# Patient Record
Sex: FEMALE | Race: WHITE | URBAN - METROPOLITAN AREA
[De-identification: names, ages, dates, MRNs, and addresses within clinical notes are randomized per-mention and may not be internally consistent; named-entity substitution may affect disease eponyms.]

---

## 2023-03-22 ENCOUNTER — OFFICE VISIT (OUTPATIENT)
Dept: PRIMARY CARE CLINIC | Facility: CLINIC | Age: 63
End: 2023-03-22
Payer: COMMERCIAL

## 2023-03-22 VITALS
BODY MASS INDEX: 28.74 KG/M2 | DIASTOLIC BLOOD PRESSURE: 82 MMHG | WEIGHT: 178.81 LBS | RESPIRATION RATE: 16 BRPM | SYSTOLIC BLOOD PRESSURE: 160 MMHG | TEMPERATURE: 98 F | HEART RATE: 96 BPM | HEIGHT: 66 IN | OXYGEN SATURATION: 99 %

## 2023-03-22 DIAGNOSIS — H93.11 TINNITUS OF RIGHT EAR: ICD-10-CM

## 2023-03-22 DIAGNOSIS — Z12.31 BREAST CANCER SCREENING BY MAMMOGRAM: ICD-10-CM

## 2023-03-22 DIAGNOSIS — Z00.00 ROUTINE GENERAL MEDICAL EXAMINATION AT A HEALTH CARE FACILITY: Primary | ICD-10-CM

## 2023-03-22 DIAGNOSIS — I10 BENIGN ESSENTIAL HTN: ICD-10-CM

## 2023-03-22 PROCEDURE — 3077F SYST BP >= 140 MM HG: CPT | Mod: CPTII,S$GLB,, | Performed by: INTERNAL MEDICINE

## 2023-03-22 PROCEDURE — 3008F PR BODY MASS INDEX (BMI) DOCUMENTED: ICD-10-PCS | Mod: CPTII,S$GLB,, | Performed by: INTERNAL MEDICINE

## 2023-03-22 PROCEDURE — 1160F PR REVIEW ALL MEDS BY PRESCRIBER/CLIN PHARMACIST DOCUMENTED: ICD-10-PCS | Mod: CPTII,S$GLB,, | Performed by: INTERNAL MEDICINE

## 2023-03-22 PROCEDURE — 1159F PR MEDICATION LIST DOCUMENTED IN MEDICAL RECORD: ICD-10-PCS | Mod: CPTII,S$GLB,, | Performed by: INTERNAL MEDICINE

## 2023-03-22 PROCEDURE — 99386 PREV VISIT NEW AGE 40-64: CPT | Mod: S$GLB,,, | Performed by: INTERNAL MEDICINE

## 2023-03-22 PROCEDURE — 3077F PR MOST RECENT SYSTOLIC BLOOD PRESSURE >= 140 MM HG: ICD-10-PCS | Mod: CPTII,S$GLB,, | Performed by: INTERNAL MEDICINE

## 2023-03-22 PROCEDURE — 3079F PR MOST RECENT DIASTOLIC BLOOD PRESSURE 80-89 MM HG: ICD-10-PCS | Mod: CPTII,S$GLB,, | Performed by: INTERNAL MEDICINE

## 2023-03-22 PROCEDURE — 1160F RVW MEDS BY RX/DR IN RCRD: CPT | Mod: CPTII,S$GLB,, | Performed by: INTERNAL MEDICINE

## 2023-03-22 PROCEDURE — 1159F MED LIST DOCD IN RCRD: CPT | Mod: CPTII,S$GLB,, | Performed by: INTERNAL MEDICINE

## 2023-03-22 PROCEDURE — 3008F BODY MASS INDEX DOCD: CPT | Mod: CPTII,S$GLB,, | Performed by: INTERNAL MEDICINE

## 2023-03-22 PROCEDURE — 3079F DIAST BP 80-89 MM HG: CPT | Mod: CPTII,S$GLB,, | Performed by: INTERNAL MEDICINE

## 2023-03-22 PROCEDURE — 99386 PR PREVENTIVE VISIT,NEW,40-64: ICD-10-PCS | Mod: S$GLB,,, | Performed by: INTERNAL MEDICINE

## 2023-03-22 RX ORDER — HYDROCHLOROTHIAZIDE 12.5 MG/1
12.5 TABLET ORAL DAILY
Qty: 30 TABLET | Refills: 11 | Status: SHIPPED | OUTPATIENT
Start: 2023-03-22 | End: 2024-03-04

## 2023-03-22 NOTE — PROGRESS NOTES
Subjective:      Patient ID: Safia Willingham is a 62 y.o. female.    Chief Complaint: Establish Care (Pt stated for the past few months shes been feeling really sleepy during the day), Tinnitus (Pt c/o pounding tinnitis in right ear within the past few weeks/month), and Fall (Pt stated she fell a few weeks ago and hit head on concrete.. CT was done at [facility])      Patient with no known medical problem Presented today to establish care and for wellness exam.    Patient blood pressure seem to be running high.  Patient reports she has checked blood pressure multiple times at home as well and it was running high.  Patient before yesterday check blood pressure a few months ago and at that time blood pressure was okay 120s over 70s.  Patient denies any chest pain shortness of breath but has ankle swelling on and off on long travel.  Patient denies PND and orthopnea.    Patient reports feeling tired and sleepy during the daytime x few months.  Patient denies snoring at night, had 1 observed apnea episode long ago, patient reports not feeling fresh when she wakes up in the morning, patient may fall asleep watching TV, patient denies falling asleep while talking to people or while driving.    Patient also complained of tinnitus/ringing in right ear for last few months.  Patient reports the symptoms are going on for long but it is becoming more louder/pounding.  It is intermittent + also has some decreased hearing.  Patient denies any runny nose, nasal congestion, postnasal drip.    Patient reports an episode where she was running backwards and had a fall landing and her buttocks and hit the head on concrete.  Patient denies any loss of consciousness, no weakness in arms or legs.  Patient was taken to hospital where CT scan of the head was normal.  Patient reports headaches are better but still has some sacral area pain.         Review of Systems   Constitutional:  Negative for chills, diaphoresis, fever, malaise/fatigue and  weight loss.   HENT:  Positive for tinnitus. Negative for congestion, ear pain, sinus pain and sore throat.    Eyes:  Negative for blurred vision and photophobia.   Respiratory:  Negative for cough, hemoptysis, shortness of breath and wheezing.    Cardiovascular:  Negative for chest pain, palpitations, orthopnea, leg swelling and PND.   Gastrointestinal:  Negative for abdominal pain, blood in stool, constipation, diarrhea, heartburn, melena, nausea and vomiting.   Genitourinary:  Negative for dysuria, frequency and urgency.   Musculoskeletal:  Negative for back pain, myalgias and neck pain.   Skin:  Negative for rash.   Neurological:  Negative for dizziness, tremors, seizures, loss of consciousness and weakness.   Endo/Heme/Allergies:  Negative for polydipsia.   Psychiatric/Behavioral:  Negative for depression and hallucinations. The patient does not have insomnia.    Objective:     Physical Exam  Constitutional:       General: She is not in acute distress.     Appearance: She is not diaphoretic.   HENT:      Ears:      Comments: Bilateral tympanic membrane are bulging without any erythema  Neck:      Thyroid: No thyromegaly.      Comments: No thyromegaly  Cardiovascular:      Rate and Rhythm: Normal rate and regular rhythm.      Heart sounds: Normal heart sounds. No murmur heard.  Pulmonary:      Effort: Pulmonary effort is normal. No respiratory distress.      Breath sounds: Normal breath sounds. No wheezing.   Chest:      Chest wall: No tenderness.   Abdominal:      General: Bowel sounds are normal. There is no distension.      Palpations: Abdomen is soft.      Tenderness: There is no abdominal tenderness.   Musculoskeletal:         General: No tenderness.   Neurological:      Mental Status: She is alert and oriented to person, place, and time.      Cranial Nerves: No cranial nerve deficit.      Sensory: No sensory deficit.   Psychiatric:         Behavior: Behavior normal.         Thought Content: Thought content  normal.         Judgment: Judgment normal.     Assessment:       ICD-10-CM ICD-9-CM   1. Routine general medical examination at a health care facility  Z00.00 V70.0   2. Breast cancer screening by mammogram  Z12.31 V76.12   3. Benign essential HTN  I10 401.1   4. Tinnitus of right ear  H93.11 388.30       Plan:      Patient is here for wellness exam.  Will order Annual labs.   Will order mammogram.  Patient reports colonoscopy was done few years ago and does not want to do it again.  Will try to get reports from hospital.  Patient previously blood pressures were under good control but in last few days blood pressures have been persistently high  Will start on hydrochlorothiazide low-dose  Advised patient to monitor blood pressures at home and bring log  Patient has symptoms suggestive of hypothyroidism vs obstructive sleep apnea  Will order basic lab work..  If all is normal will consider sleep study  Patient has some weight gain as well..  Will consider weight loss medication if all labs are normal.   Advised patient to minimize carbohydrate consult  Patient has ringing in right ear will refer to ENT  Patient has serous otitis media advised patient to take Flonase regularly

## 2023-03-23 ENCOUNTER — PATIENT MESSAGE (OUTPATIENT)
Dept: ADMINISTRATIVE | Facility: HOSPITAL | Age: 63
End: 2023-03-23
Payer: COMMERCIAL

## 2023-03-23 DIAGNOSIS — Z12.11 SCREENING FOR COLON CANCER: ICD-10-CM

## 2023-03-23 LAB
ALBUMIN SERPL-MCNC: 4.7 G/DL (ref 3.6–5.1)
ALBUMIN/GLOB SERPL: 2 (CALC) (ref 1–2.5)
ALP SERPL-CCNC: 97 U/L (ref 37–153)
ALT SERPL-CCNC: 12 U/L (ref 6–29)
AST SERPL-CCNC: 14 U/L (ref 10–35)
BASOPHILS # BLD AUTO: 31 CELLS/UL (ref 0–200)
BASOPHILS NFR BLD AUTO: 0.4 %
BILIRUB SERPL-MCNC: 0.5 MG/DL (ref 0.2–1.2)
BUN SERPL-MCNC: 16 MG/DL (ref 7–25)
BUN/CREAT SERPL: ABNORMAL (CALC) (ref 6–22)
CALCIUM SERPL-MCNC: 9.7 MG/DL (ref 8.6–10.4)
CHLORIDE SERPL-SCNC: 106 MMOL/L (ref 98–110)
CHOLEST SERPL-MCNC: 326 MG/DL
CHOLEST/HDLC SERPL: 5.6 (CALC)
CO2 SERPL-SCNC: 23 MMOL/L (ref 20–32)
CREAT SERPL-MCNC: 0.88 MG/DL (ref 0.5–1.05)
EGFR: 73 ML/MIN/1.73M2
EOSINOPHIL # BLD AUTO: 46 CELLS/UL (ref 15–500)
EOSINOPHIL NFR BLD AUTO: 0.6 %
ERYTHROCYTE [DISTWIDTH] IN BLOOD BY AUTOMATED COUNT: 13 % (ref 11–15)
GLOBULIN SER CALC-MCNC: 2.4 G/DL (CALC) (ref 1.9–3.7)
GLUCOSE SERPL-MCNC: 114 MG/DL (ref 65–99)
HBA1C MFR BLD: 5.6 % OF TOTAL HGB
HCT VFR BLD AUTO: 42.7 % (ref 35–45)
HCV AB S/CO SERPL IA: <0.02
HCV AB SERPL QL IA: NORMAL
HDLC SERPL-MCNC: 58 MG/DL
HGB BLD-MCNC: 14.3 G/DL (ref 11.7–15.5)
HIV 1+2 AB+HIV1 P24 AG SERPL QL IA: NORMAL
LDLC SERPL CALC-MCNC: 229 MG/DL (CALC)
LYMPHOCYTES # BLD AUTO: 1070 CELLS/UL (ref 850–3900)
LYMPHOCYTES NFR BLD AUTO: 13.9 %
MCH RBC QN AUTO: 30.8 PG (ref 27–33)
MCHC RBC AUTO-ENTMCNC: 33.5 G/DL (ref 32–36)
MCV RBC AUTO: 91.8 FL (ref 80–100)
MONOCYTES # BLD AUTO: 293 CELLS/UL (ref 200–950)
MONOCYTES NFR BLD AUTO: 3.8 %
NEUTROPHILS # BLD AUTO: 6260 CELLS/UL (ref 1500–7800)
NEUTROPHILS NFR BLD AUTO: 81.3 %
NONHDLC SERPL-MCNC: 268 MG/DL (CALC)
PLATELET # BLD AUTO: 281 THOUSAND/UL (ref 140–400)
PMV BLD REES-ECKER: 10.2 FL (ref 7.5–12.5)
POTASSIUM SERPL-SCNC: 4.7 MMOL/L (ref 3.5–5.3)
PROT SERPL-MCNC: 7.1 G/DL (ref 6.1–8.1)
RBC # BLD AUTO: 4.65 MILLION/UL (ref 3.8–5.1)
SODIUM SERPL-SCNC: 141 MMOL/L (ref 135–146)
TRIGL SERPL-MCNC: 201 MG/DL
TSH SERPL-ACNC: 1.27 MIU/L (ref 0.4–4.5)
WBC # BLD AUTO: 7.7 THOUSAND/UL (ref 3.8–10.8)

## 2023-03-24 DIAGNOSIS — E78.2 MIXED HYPERLIPIDEMIA: Primary | ICD-10-CM

## 2023-03-24 RX ORDER — ATORVASTATIN CALCIUM 40 MG/1
40 TABLET, FILM COATED ORAL DAILY
Qty: 90 TABLET | Refills: 3 | Status: SHIPPED | OUTPATIENT
Start: 2023-03-24 | End: 2024-02-22 | Stop reason: SDUPTHER

## 2023-03-31 ENCOUNTER — PATIENT MESSAGE (OUTPATIENT)
Dept: FAMILY MEDICINE | Facility: CLINIC | Age: 63
End: 2023-03-31
Payer: COMMERCIAL

## 2023-04-05 LAB — HEMOCCULT STL QL IA: NEGATIVE

## 2023-04-14 ENCOUNTER — OFFICE VISIT (OUTPATIENT)
Dept: PRIMARY CARE CLINIC | Facility: CLINIC | Age: 63
End: 2023-04-14
Payer: COMMERCIAL

## 2023-04-14 VITALS
TEMPERATURE: 98 F | SYSTOLIC BLOOD PRESSURE: 145 MMHG | WEIGHT: 173.81 LBS | OXYGEN SATURATION: 97 % | HEIGHT: 66 IN | HEART RATE: 92 BPM | DIASTOLIC BLOOD PRESSURE: 82 MMHG | BODY MASS INDEX: 27.93 KG/M2

## 2023-04-14 DIAGNOSIS — I10 PRIMARY HYPERTENSION: ICD-10-CM

## 2023-04-14 DIAGNOSIS — E66.3 OVERWEIGHT (BMI 25.0-29.9): Primary | ICD-10-CM

## 2023-04-14 DIAGNOSIS — E78.00 HYPERCHOLESTEROLEMIA: ICD-10-CM

## 2023-04-14 PROCEDURE — 99214 OFFICE O/P EST MOD 30 MIN: CPT | Mod: S$GLB,,, | Performed by: INTERNAL MEDICINE

## 2023-04-14 PROCEDURE — 3077F PR MOST RECENT SYSTOLIC BLOOD PRESSURE >= 140 MM HG: ICD-10-PCS | Mod: CPTII,S$GLB,, | Performed by: INTERNAL MEDICINE

## 2023-04-14 PROCEDURE — 3079F DIAST BP 80-89 MM HG: CPT | Mod: CPTII,S$GLB,, | Performed by: INTERNAL MEDICINE

## 2023-04-14 PROCEDURE — 3079F PR MOST RECENT DIASTOLIC BLOOD PRESSURE 80-89 MM HG: ICD-10-PCS | Mod: CPTII,S$GLB,, | Performed by: INTERNAL MEDICINE

## 2023-04-14 PROCEDURE — 1159F PR MEDICATION LIST DOCUMENTED IN MEDICAL RECORD: ICD-10-PCS | Mod: CPTII,S$GLB,, | Performed by: INTERNAL MEDICINE

## 2023-04-14 PROCEDURE — 99214 PR OFFICE/OUTPT VISIT, EST, LEVL IV, 30-39 MIN: ICD-10-PCS | Mod: S$GLB,,, | Performed by: INTERNAL MEDICINE

## 2023-04-14 PROCEDURE — 3077F SYST BP >= 140 MM HG: CPT | Mod: CPTII,S$GLB,, | Performed by: INTERNAL MEDICINE

## 2023-04-14 PROCEDURE — 1160F PR REVIEW ALL MEDS BY PRESCRIBER/CLIN PHARMACIST DOCUMENTED: ICD-10-PCS | Mod: CPTII,S$GLB,, | Performed by: INTERNAL MEDICINE

## 2023-04-14 PROCEDURE — 1160F RVW MEDS BY RX/DR IN RCRD: CPT | Mod: CPTII,S$GLB,, | Performed by: INTERNAL MEDICINE

## 2023-04-14 PROCEDURE — 3008F PR BODY MASS INDEX (BMI) DOCUMENTED: ICD-10-PCS | Mod: CPTII,S$GLB,, | Performed by: INTERNAL MEDICINE

## 2023-04-14 PROCEDURE — 1159F MED LIST DOCD IN RCRD: CPT | Mod: CPTII,S$GLB,, | Performed by: INTERNAL MEDICINE

## 2023-04-14 PROCEDURE — 3044F HG A1C LEVEL LT 7.0%: CPT | Mod: CPTII,S$GLB,, | Performed by: INTERNAL MEDICINE

## 2023-04-14 PROCEDURE — 3044F PR MOST RECENT HEMOGLOBIN A1C LEVEL <7.0%: ICD-10-PCS | Mod: CPTII,S$GLB,, | Performed by: INTERNAL MEDICINE

## 2023-04-14 PROCEDURE — 3008F BODY MASS INDEX DOCD: CPT | Mod: CPTII,S$GLB,, | Performed by: INTERNAL MEDICINE

## 2023-04-14 NOTE — PROGRESS NOTES
Subjective:      Patient ID: Safia Willingham is a 62 y.o. female.    Chief Complaint: Follow-up    With hypertension and was started on hydrochlorothiazide on last visit.  Patient reports home blood pressures are under better controlled running between 120s to 130 over 80s.  Patient denies any chest pain shortness of breath, ankle swelling.  Patient reports compliance with medication    Patient is found to have hypercholesterolemia with total cholesterol of 326 and LDL of 229.  Patient reports father and mother both had cardiovascular disease.  Father had cardiac event and 50s and mother had cardiac issue in 60s.  Denies family history or personal history of xanthelasma.     Patient previously reported feeling tired and fatigued reports feeling better than before.  Also ringing in the ear is improved after starting using Flonase.  Patient previously complained of back pain that seems to have improved as well.     Review of Systems   Constitutional:  Negative for chills, diaphoresis, fever, malaise/fatigue and weight loss.   HENT:  Negative for congestion, ear pain, sinus pain, sore throat and tinnitus.    Eyes:  Negative for blurred vision and photophobia.   Respiratory:  Negative for cough, hemoptysis, shortness of breath and wheezing.    Cardiovascular:  Negative for chest pain, palpitations, orthopnea, leg swelling and PND.   Gastrointestinal:  Negative for abdominal pain, blood in stool, constipation, diarrhea, heartburn, melena, nausea and vomiting.   Genitourinary:  Negative for dysuria, frequency and urgency.   Musculoskeletal:  Negative for back pain, myalgias and neck pain.   Skin:  Negative for rash.   Neurological:  Negative for dizziness, tremors, seizures, loss of consciousness and weakness.   Endo/Heme/Allergies:  Negative for polydipsia.   Psychiatric/Behavioral:  Negative for depression and hallucinations. The patient does not have insomnia.      Objective:     BP (!) 145/82 (BP Location: Right arm,  "Patient Position: Sitting, BP Method: Medium (Automatic))   Pulse 92   Temp 97.6 °F (36.4 °C)   Ht 5' 6" (1.676 m)   Wt 78.8 kg (173 lb 12.8 oz)   SpO2 97%   BMI 28.05 kg/m²     Physical Exam  Constitutional:       General: She is not in acute distress.     Appearance: She is not diaphoretic.   Neck:      Thyroid: No thyromegaly.   Cardiovascular:      Rate and Rhythm: Normal rate and regular rhythm.      Heart sounds: Normal heart sounds. No murmur heard.  Pulmonary:      Effort: Pulmonary effort is normal. No respiratory distress.      Breath sounds: Normal breath sounds. No wheezing.   Abdominal:      General: Bowel sounds are normal. There is no distension.      Palpations: Abdomen is soft.      Tenderness: There is no abdominal tenderness.   Lymphadenopathy:      Cervical: No cervical adenopathy.   Neurological:      Mental Status: She is alert and oriented to person, place, and time.   Psychiatric:         Behavior: Behavior normal.         Thought Content: Thought content normal.         Judgment: Judgment normal.     Assessment:       ICD-10-CM ICD-9-CM   1. Overweight (BMI 25.0-29.9)  E66.3 278.02   2. Hypercholesterolemia  E78.00 272.0   3. Primary hypertension  I10 401.9       Plan:     Patient last LDL of 229 is really high.   Patient  father had cardiovascular event in his 50s.   Patient has " probable"  familial hypercholesterolemia according to Jose broom criteria   Will be aggressive with  cholesterol control.   Unfortunately genetic testing and counseling is not available in [facility].   Patient has hypertension and blood pressures at home are running much better controlled after adding hydrochlorothiazide   Will not change medication advised patient to monitor blood pressure at home and bring log   Patient BMI of 28 suggest patient is overweight.   But patient does not have  big belly or lot of fat tissue.    Advised patient to be more adherent with diet avoid carbohydrate, fried food.  Will not " be very aggressive with weight loss      Medication List with Changes/Refills   Current Medications    ATORVASTATIN (LIPITOR) 40 MG TABLET    Take 1 tablet (40 mg total) by mouth once daily.    HYDROCHLOROTHIAZIDE (HYDRODIURIL) 12.5 MG TAB    Take 1 tablet (12.5 mg total) by mouth once daily.

## 2023-05-08 ENCOUNTER — PATIENT OUTREACH (OUTPATIENT)
Dept: ADMINISTRATIVE | Facility: HOSPITAL | Age: 63
End: 2023-05-08
Payer: COMMERCIAL

## 2023-05-08 NOTE — PROGRESS NOTES
[facility] Mammogram Report: Attempted to contact the patient to discuss overdue mammogram, patient answered the phone and then hung up.

## 2023-05-17 DIAGNOSIS — E78.00 HYPERCHOLESTEROLEMIA: Primary | ICD-10-CM

## 2023-05-18 ENCOUNTER — CLINICAL SUPPORT (OUTPATIENT)
Dept: PRIMARY CARE CLINIC | Facility: CLINIC | Age: 63
End: 2023-05-18
Payer: COMMERCIAL

## 2023-05-18 LAB
ALBUMIN SERPL BCP-MCNC: 3.9 G/DL (ref 3.4–5)
ALP SERPL-CCNC: 99 U/L (ref 45–117)
ALT SERPL W P-5'-P-CCNC: 30 U/L (ref 13–56)
ANION GAP SERPL CALC-SCNC: 6 MMOL/L (ref 3–11)
AST SERPL-CCNC: 23 U/L (ref 15–37)
BILIRUB SERPL-MCNC: 0.6 MG/DL (ref 0.2–1)
BUN SERPL-MCNC: 11 MG/DL (ref 7–18)
BUN/CREAT SERPL: 11.95 RATIO
CALCIUM SERPL-MCNC: 9.4 MG/DL (ref 8.5–10.1)
CHLORIDE SERPL-SCNC: 109 MMOL/L (ref 98–107)
CHOLEST SERPL-MSCNC: 206 MG/DL
CO2 SERPL-SCNC: 27 MMOL/L (ref 21–32)
CREAT SERPL-MCNC: 0.92 MG/DL (ref 0.55–1.02)
GFR ESTIMATION: > 60
GLUCOSE SERPL-MCNC: 106 MG/DL (ref 74–106)
HDLC SERPL-MCNC: 60 MG/DL
LDLC SERPL CALC-MCNC: 112 MG/DL
POTASSIUM SERPL-SCNC: 4.3 MMOL/L (ref 3.5–5.1)
PROT SERPL-MCNC: 7.2 G/DL (ref 6.4–8.2)
SODIUM BLD-SCNC: 142 MMOL/L (ref 131–143)
TRIGL SERPL-MCNC: 170 MG/DL (ref 0–149)
VLDL CHOLESTEROL: 34 MG/DL

## 2023-05-22 ENCOUNTER — OFFICE VISIT (OUTPATIENT)
Dept: PRIMARY CARE CLINIC | Facility: CLINIC | Age: 63
End: 2023-05-22
Payer: COMMERCIAL

## 2023-05-22 VITALS
WEIGHT: 173.63 LBS | HEART RATE: 88 BPM | DIASTOLIC BLOOD PRESSURE: 75 MMHG | SYSTOLIC BLOOD PRESSURE: 127 MMHG | RESPIRATION RATE: 16 BRPM | OXYGEN SATURATION: 97 % | HEIGHT: 66 IN | BODY MASS INDEX: 27.91 KG/M2 | TEMPERATURE: 97 F

## 2023-05-22 DIAGNOSIS — E78.00 HYPERCHOLESTEROLEMIA: ICD-10-CM

## 2023-05-22 DIAGNOSIS — I10 PRIMARY HYPERTENSION: ICD-10-CM

## 2023-05-22 DIAGNOSIS — E66.3 OVERWEIGHT (BMI 25.0-29.9): Primary | ICD-10-CM

## 2023-05-22 PROCEDURE — 1160F RVW MEDS BY RX/DR IN RCRD: CPT | Mod: CPTII,S$GLB,, | Performed by: INTERNAL MEDICINE

## 2023-05-22 PROCEDURE — 1159F MED LIST DOCD IN RCRD: CPT | Mod: CPTII,S$GLB,, | Performed by: INTERNAL MEDICINE

## 2023-05-22 PROCEDURE — 3044F PR MOST RECENT HEMOGLOBIN A1C LEVEL <7.0%: ICD-10-PCS | Mod: CPTII,S$GLB,, | Performed by: INTERNAL MEDICINE

## 2023-05-22 PROCEDURE — 1159F PR MEDICATION LIST DOCUMENTED IN MEDICAL RECORD: ICD-10-PCS | Mod: CPTII,S$GLB,, | Performed by: INTERNAL MEDICINE

## 2023-05-22 PROCEDURE — 99214 PR OFFICE/OUTPT VISIT, EST, LEVL IV, 30-39 MIN: ICD-10-PCS | Mod: S$GLB,,, | Performed by: INTERNAL MEDICINE

## 2023-05-22 PROCEDURE — 3008F PR BODY MASS INDEX (BMI) DOCUMENTED: ICD-10-PCS | Mod: CPTII,S$GLB,, | Performed by: INTERNAL MEDICINE

## 2023-05-22 PROCEDURE — 1160F PR REVIEW ALL MEDS BY PRESCRIBER/CLIN PHARMACIST DOCUMENTED: ICD-10-PCS | Mod: CPTII,S$GLB,, | Performed by: INTERNAL MEDICINE

## 2023-05-22 PROCEDURE — 3074F PR MOST RECENT SYSTOLIC BLOOD PRESSURE < 130 MM HG: ICD-10-PCS | Mod: CPTII,S$GLB,, | Performed by: INTERNAL MEDICINE

## 2023-05-22 PROCEDURE — 3074F SYST BP LT 130 MM HG: CPT | Mod: CPTII,S$GLB,, | Performed by: INTERNAL MEDICINE

## 2023-05-22 PROCEDURE — 3078F PR MOST RECENT DIASTOLIC BLOOD PRESSURE < 80 MM HG: ICD-10-PCS | Mod: CPTII,S$GLB,, | Performed by: INTERNAL MEDICINE

## 2023-05-22 PROCEDURE — 3008F BODY MASS INDEX DOCD: CPT | Mod: CPTII,S$GLB,, | Performed by: INTERNAL MEDICINE

## 2023-05-22 PROCEDURE — 99214 OFFICE O/P EST MOD 30 MIN: CPT | Mod: S$GLB,,, | Performed by: INTERNAL MEDICINE

## 2023-05-22 PROCEDURE — 3044F HG A1C LEVEL LT 7.0%: CPT | Mod: CPTII,S$GLB,, | Performed by: INTERNAL MEDICINE

## 2023-05-22 PROCEDURE — 3078F DIAST BP <80 MM HG: CPT | Mod: CPTII,S$GLB,, | Performed by: INTERNAL MEDICINE

## 2023-05-22 RX ORDER — PHENTERMINE AND TOPIRAMATE 3.75; 23 MG/1; MG/1
1 CAPSULE, EXTENDED RELEASE ORAL DAILY
Qty: 14 CAPSULE | Refills: 0 | Status: SHIPPED | OUTPATIENT
Start: 2023-05-22 | End: 2023-06-05

## 2023-05-22 RX ORDER — PHENTERMINE AND TOPIRAMATE 7.5; 46 MG/1; MG/1
1 CAPSULE, EXTENDED RELEASE ORAL DAILY
Qty: 30 CAPSULE | Refills: 0 | Status: SHIPPED | OUTPATIENT
Start: 2023-05-22 | End: 2023-06-21

## 2023-05-22 NOTE — PROGRESS NOTES
"  Subjective:      Patient ID: Safia Willingham is a 62 y.o. female.    Chief Complaint: Hypertension (F/u)    With hypertension and was started on hydrochlorothiazide on last visit.  Patient reports home blood pressures are under better controlled running between 120s to 130 over 80s.  Patient denies any chest pain shortness of breath, ankle swelling.  Patient reports compliance with medication.    Patient with hypercholesterolemia with LDL of 229.   Patient has " probable"  familial hypercholesterolemia according to Jose broom criteria.  Patient has strong family history of premature coronary artery disease. Patient was started on atorvastatin 40 mg and last LDL of 112 is markedly improved from 02/29.  Patient reports compliance with medication.      Review of Systems   Constitutional:  Negative for chills, diaphoresis, fever, malaise/fatigue and weight loss.   HENT:  Negative for congestion, ear pain, sinus pain, sore throat and tinnitus.    Eyes:  Negative for blurred vision and photophobia.   Respiratory:  Negative for cough, hemoptysis, shortness of breath and wheezing.    Cardiovascular:  Negative for chest pain, palpitations, orthopnea, leg swelling and PND.   Gastrointestinal:  Negative for abdominal pain, blood in stool, constipation, diarrhea, heartburn, melena, nausea and vomiting.   Genitourinary:  Negative for dysuria, frequency and urgency.   Musculoskeletal:  Negative for back pain, myalgias and neck pain.   Skin:  Negative for rash.   Neurological:  Negative for dizziness, tremors, seizures, loss of consciousness and weakness.   Endo/Heme/Allergies:  Negative for polydipsia.   Psychiatric/Behavioral:  Negative for depression and hallucinations. The patient does not have insomnia.    Objective:   /75 Comment: Home BP today  Pulse 88   Temp 97.1 °F (36.2 °C) (Temporal)   Resp 16   Ht 5' 6" (1.676 m)   Wt 78.7 kg (173 lb 9.6 oz)   SpO2 97%   BMI 28.02 kg/m²     Physical Exam  Constitutional: " "      General: She is not in acute distress.     Appearance: She is not diaphoretic.   Neck:      Thyroid: No thyromegaly.   Cardiovascular:      Rate and Rhythm: Normal rate and regular rhythm.      Heart sounds: Normal heart sounds. No murmur heard.  Pulmonary:      Effort: Pulmonary effort is normal. No respiratory distress.      Breath sounds: Normal breath sounds. No wheezing.   Abdominal:      General: Bowel sounds are normal. There is no distension.      Palpations: Abdomen is soft.      Tenderness: There is no abdominal tenderness.   Neurological:      Mental Status: She is alert and oriented to person, place, and time.   Psychiatric:         Behavior: Behavior normal.         Thought Content: Thought content normal.         Judgment: Judgment normal.     Assessment:       ICD-10-CM ICD-9-CM   1. Overweight (BMI 25.0-29.9)  E66.3 278.02   2. Primary hypertension  I10 401.9   3. Hypercholesterolemia  E78.00 272.0       Plan:     Patient has " probable"  familial hypercholesterolemia according to Jose broom criteria.   LDL improved to 112 from 229..   Will try to be aggressive with cholesterol control   Keeping in mind patient is young +  has family history of coronary artery disease   Will increase atorvastatin to 80 mg and see if patient tolerates the medication   Patient blood pressures are under control with hydrochlorothiazide   Office blood pressures are high but home blood pressures are much better controlled.    Advised patient to continue to monitor blood pressures at home.   Patient  is overweight trying to lose weight.  Multiple weight loss medications are discussed   Will use Qsymia    Medication List with Changes/Refills   New Medications    PHENTERMINE-TOPIRAMATE (QSYMIA) 3.75-23 MG CM24    Take 1 each by mouth once daily. for 14 days    PHENTERMINE-TOPIRAMATE (QSYMIA) 7.5-46 MG CM24    Take 1 each by mouth once daily.   Current Medications    ATORVASTATIN (LIPITOR) 40 MG TABLET    Take 1 tablet " (40 mg total) by mouth once daily.    HYDROCHLOROTHIAZIDE (HYDRODIURIL) 12.5 MG TAB    Take 1 tablet (12.5 mg total) by mouth once daily.

## 2023-06-09 LAB — BCS RECOMMENDATION EXT: NORMAL

## 2023-06-23 ENCOUNTER — PATIENT OUTREACH (OUTPATIENT)
Dept: ADMINISTRATIVE | Facility: HOSPITAL | Age: 63
End: 2023-06-23
Payer: COMMERCIAL

## 2023-07-17 ENCOUNTER — OFFICE VISIT (OUTPATIENT)
Dept: PRIMARY CARE CLINIC | Facility: CLINIC | Age: 63
End: 2023-07-17
Payer: MEDICARE

## 2023-07-17 VITALS
WEIGHT: 159.81 LBS | DIASTOLIC BLOOD PRESSURE: 70 MMHG | HEART RATE: 74 BPM | OXYGEN SATURATION: 99 % | SYSTOLIC BLOOD PRESSURE: 113 MMHG | HEIGHT: 66 IN | RESPIRATION RATE: 16 BRPM | TEMPERATURE: 97 F | BODY MASS INDEX: 25.68 KG/M2

## 2023-07-17 DIAGNOSIS — I10 PRIMARY HYPERTENSION: ICD-10-CM

## 2023-07-17 DIAGNOSIS — E66.3 OVERWEIGHT (BMI 25.0-29.9): Primary | ICD-10-CM

## 2023-07-17 DIAGNOSIS — E78.00 HYPERCHOLESTEROLEMIA: ICD-10-CM

## 2023-07-17 PROCEDURE — 99214 PR OFFICE/OUTPT VISIT, EST, LEVL IV, 30-39 MIN: ICD-10-PCS | Mod: S$GLB,,, | Performed by: INTERNAL MEDICINE

## 2023-07-17 PROCEDURE — 99214 OFFICE O/P EST MOD 30 MIN: CPT | Mod: S$GLB,,, | Performed by: INTERNAL MEDICINE

## 2023-07-17 RX ORDER — PHENTERMINE AND TOPIRAMATE 7.5; 46 MG/1; MG/1
1 CAPSULE, EXTENDED RELEASE ORAL DAILY
Qty: 30 CAPSULE | Refills: 0 | Status: SHIPPED | OUTPATIENT
Start: 2023-07-17 | End: 2023-08-16

## 2023-07-17 NOTE — PROGRESS NOTES
"Subjective:      Patient ID: Safia Willingham is a 62 y.o. female.    Chief Complaint: No chief complaint on file.    HPI:  With hypertension and blood pressure seem under control with hydrochlorothiazide.  Patient reports compliance with medication.  Patient has hypercholesterolemia with last LDL of 112 improved from 229 and patient has "probable familial hypercholesterolemia according to Jose broom criteria.  Patient is on atorvastatin 40 mg and was recommended to increase it to 80 mg but did not tolerate the medication secondary to multiple joint pains.  Patient was overweight and was given calcemia for weight loss.  Patient has lost 14 lb in last few months.  Patient is tolerating the medication well and wants to continue the medication.    Review of Systems   Constitutional:  Positive for weight loss (14 lbs weight loss). Negative for chills, diaphoresis, fever and malaise/fatigue.   HENT:  Negative for congestion, ear pain, hearing loss, sinus pain, sore throat and tinnitus.    Eyes:  Negative for blurred vision, photophobia and discharge.   Respiratory:  Negative for cough, hemoptysis, shortness of breath and wheezing.    Cardiovascular:  Negative for chest pain, palpitations, orthopnea, leg swelling and PND.   Gastrointestinal:  Negative for abdominal pain, blood in stool, constipation, diarrhea, heartburn, melena, nausea and vomiting.   Genitourinary:  Negative for dysuria, frequency, hematuria and urgency.   Musculoskeletal:  Negative for back pain, myalgias and neck pain.   Skin:  Negative for rash.   Neurological:  Negative for dizziness, tremors, seizures, loss of consciousness, weakness and headaches.   Endo/Heme/Allergies:  Negative for polydipsia.   Psychiatric/Behavioral:  Negative for depression and hallucinations. The patient does not have insomnia.    Objective:   /70 (BP Location: Right arm, Patient Position: Sitting, BP Method: Large (Automatic)) Comment: 6:30am home reading  Pulse 74   " "Temp 97.4 °F (36.3 °C) (Temporal)   Resp 16   Ht 5' 6" (1.676 m)   Wt 72.5 kg (159 lb 12.8 oz)   SpO2 99%   BMI 25.79 kg/m²     Physical Exam: Patient not examined  Assessment:       ICD-10-CM ICD-9-CM   1. Overweight (BMI 25.0-29.9)  E66.3 278.02   2. Primary hypertension  I10 401.9   3. Hypercholesterolemia  E78.00 272.0       Plan:     Patient has hypertension and blood pressure seem under good control  Will continue medication  Patient has hyperlipidemia with last LDL of 112 markedly improved from 229.  Patient is on atorvastatin 40 mg and did not tolerate 80 mg  Patient has lost significant weight 14lbs.   Will use glycemia for 1 more month.   And this may affect patient cholesterol level.  Will check lipid profile again.    Medication List with Changes/Refills   Current Medications    ATORVASTATIN (LIPITOR) 40 MG TABLET    Take 1 tablet (40 mg total) by mouth once daily.    HYDROCHLOROTHIAZIDE (HYDRODIURIL) 12.5 MG TAB    Take 1 tablet (12.5 mg total) by mouth once daily.        "

## 2023-08-24 ENCOUNTER — PATIENT MESSAGE (OUTPATIENT)
Dept: ADMINISTRATIVE | Facility: HOSPITAL | Age: 63
End: 2023-08-24
Payer: MEDICARE

## 2023-09-11 ENCOUNTER — CLINICAL SUPPORT (OUTPATIENT)
Dept: OBSTETRICS AND GYNECOLOGY | Facility: CLINIC | Age: 63
End: 2023-09-11
Payer: MEDICARE

## 2023-09-11 DIAGNOSIS — Z01.89 ROUTINE LAB DRAW: Primary | ICD-10-CM

## 2023-09-11 LAB
ALBUMIN SERPL BCP-MCNC: 4 G/DL (ref 3.4–5)
ALP SERPL-CCNC: 126 U/L (ref 45–117)
ALT SERPL W P-5'-P-CCNC: 46 U/L (ref 13–56)
ANION GAP SERPL CALC-SCNC: 6 MMOL/L (ref 3–11)
AST SERPL-CCNC: 27 U/L (ref 15–37)
BILIRUB SERPL-MCNC: 0.5 MG/DL (ref 0.2–1)
BUN SERPL-MCNC: 8 MG/DL (ref 7–18)
BUN/CREAT SERPL: 9.52 RATIO
CALCIUM SERPL-MCNC: 8.8 MG/DL (ref 8.5–10.1)
CHLORIDE SERPL-SCNC: 104 MMOL/L (ref 98–107)
CHOLEST SERPL-MSCNC: 199 MG/DL
CO2 SERPL-SCNC: 29 MMOL/L (ref 21–32)
CREAT SERPL-MCNC: 0.84 MG/DL (ref 0.55–1.02)
GFR ESTIMATION: > 60
GLUCOSE SERPL-MCNC: 123 MG/DL (ref 74–106)
HDLC SERPL-MCNC: 55 MG/DL
LDLC SERPL CALC-MCNC: 106.6 MG/DL
POTASSIUM SERPL-SCNC: 4 MMOL/L (ref 3.5–5.1)
PROT SERPL-MCNC: 7 G/DL (ref 6.4–8.2)
SODIUM BLD-SCNC: 139 MMOL/L (ref 131–143)
TRIGL SERPL-MCNC: 187 MG/DL (ref 0–149)
VLDL CHOLESTEROL: 37 MG/DL

## 2023-09-11 PROCEDURE — 36415 COLL VENOUS BLD VENIPUNCTURE: CPT | Mod: S$GLB,,, | Performed by: INTERNAL MEDICINE

## 2023-09-11 PROCEDURE — 36415 PR COLLECTION VENOUS BLOOD,VENIPUNCTURE: ICD-10-PCS | Mod: S$GLB,,, | Performed by: INTERNAL MEDICINE

## 2023-09-12 DIAGNOSIS — R73.01 IMPAIRED FASTING GLUCOSE: Primary | ICD-10-CM

## 2023-09-13 ENCOUNTER — OFFICE VISIT (OUTPATIENT)
Dept: PRIMARY CARE CLINIC | Facility: CLINIC | Age: 63
End: 2023-09-13
Payer: MEDICARE

## 2023-09-13 VITALS
OXYGEN SATURATION: 99 % | TEMPERATURE: 97 F | HEART RATE: 88 BPM | DIASTOLIC BLOOD PRESSURE: 78 MMHG | SYSTOLIC BLOOD PRESSURE: 124 MMHG | RESPIRATION RATE: 16 BRPM | BODY MASS INDEX: 25.58 KG/M2 | HEIGHT: 66 IN | WEIGHT: 159.19 LBS

## 2023-09-13 DIAGNOSIS — E78.2 MIXED HYPERLIPIDEMIA: Primary | ICD-10-CM

## 2023-09-13 DIAGNOSIS — I10 PRIMARY HYPERTENSION: ICD-10-CM

## 2023-09-13 DIAGNOSIS — R73.01 IMPAIRED FASTING GLUCOSE: ICD-10-CM

## 2023-09-13 PROCEDURE — 99214 PR OFFICE/OUTPT VISIT, EST, LEVL IV, 30-39 MIN: ICD-10-PCS | Mod: S$GLB,,, | Performed by: INTERNAL MEDICINE

## 2023-09-13 PROCEDURE — 99214 OFFICE O/P EST MOD 30 MIN: CPT | Mod: S$GLB,,, | Performed by: INTERNAL MEDICINE

## 2023-09-13 RX ORDER — EZETIMIBE 10 MG/1
10 TABLET ORAL DAILY
Qty: 90 TABLET | Refills: 3 | Status: SHIPPED | OUTPATIENT
Start: 2023-09-13 | End: 2024-09-12

## 2023-09-13 NOTE — PROGRESS NOTES
"  Subjective:      Patient ID: Safia Willingham is a 63 y.o. female.    Chief Complaint: Follow-up (C/o bump on top of head, noticed it 3-4 weeks prior, sore to touch)    HPI:  With hypertension that seem under good control with hydrochlorothiazide.  Patient home blood pressures are running 118-128/74-81.  Patient denies any chest pain shortness some breath, ankle swelling    Patient has hyperlipidemia with probable familial hypercholesterolemia according to Jose broom criteria.  Patient is on atorvastatin 40 mg.  Patient did not tolerate 80 mg of atorvastatin.  Patient has been more adherent to diet and cholesterol level has improved from 112-106 but still not at goal.       Review of Systems   Constitutional:  Negative for chills, diaphoresis, fever, malaise/fatigue and weight loss.   HENT:  Negative for congestion, ear pain, sinus pain, sore throat and tinnitus.    Eyes:  Negative for blurred vision and photophobia.   Respiratory:  Negative for cough, hemoptysis, shortness of breath and wheezing.    Cardiovascular:  Negative for chest pain, palpitations, orthopnea, leg swelling and PND.   Gastrointestinal:  Negative for abdominal pain, blood in stool, constipation, diarrhea, heartburn, melena, nausea and vomiting.   Genitourinary:  Negative for dysuria, frequency and urgency.   Musculoskeletal:  Negative for back pain, myalgias and neck pain.   Skin:  Negative for rash.   Neurological:  Negative for dizziness, tremors, seizures, loss of consciousness and weakness.   Endo/Heme/Allergies:  Negative for polydipsia.   Psychiatric/Behavioral:  Negative for depression and hallucinations. The patient does not have insomnia.      Objective:   /78 Comment: Home blood pressure reading  Pulse 88   Temp 97.1 °F (36.2 °C) (Temporal)   Resp 16   Ht 5' 6" (1.676 m)   Wt 72.2 kg (159 lb 3.2 oz)   SpO2 99%   BMI 25.70 kg/m²     Physical Exam:  Patient not examined  Assessment:       ICD-10-CM ICD-9-CM   1. Mixed " hyperlipidemia  E78.2 272.2   2. Impaired fasting glucose  R73.01 790.21   3. Primary hypertension  I10 401.9       Plan:     Patient has hypertension and home blood pressures are markedly improved  Will continue to monitor + will continue hydrochlorothiazide  Patient last LDL of 106 is markedly improved from 229  Patient is on atorvastatin 40 mg and did not tolerate atorvastatin 80 mg  Will add Zetia  Patient use calcemia with significant weight loss initially but stop losing weight in last month  Patient has stop the medication  Consult patient that her weight is in good range and should not try more medication for weight loss  Patient has 2 mm sebaceous cyst in top of the cranium.  No further workup is needed  Will continue to monitor for any change in shape size  Patient last fasting blood sugar of 123 is high will check A1c  Previous A1c of 5.6 was at goal      Medication List with Changes/Refills   New Medications    EZETIMIBE (ZETIA) 10 MG TABLET    Take 1 tablet (10 mg total) by mouth once daily.   Current Medications    ATORVASTATIN (LIPITOR) 40 MG TABLET    Take 1 tablet (40 mg total) by mouth once daily.    HYDROCHLOROTHIAZIDE (HYDRODIURIL) 12.5 MG TAB    Take 1 tablet (12.5 mg total) by mouth once daily.

## 2023-09-14 ENCOUNTER — CLINICAL SUPPORT (OUTPATIENT)
Dept: OBSTETRICS AND GYNECOLOGY | Facility: CLINIC | Age: 63
End: 2023-09-14
Payer: MEDICARE

## 2023-09-14 DIAGNOSIS — Z01.89 ROUTINE LAB DRAW: Primary | ICD-10-CM

## 2023-09-14 LAB
ESTIMATED AVG GLUCOSE: 126 MG/DL
HBA1C MFR BLD: 5.7 % (ref 4.2–6.3)

## 2023-09-14 PROCEDURE — 36415 COLL VENOUS BLD VENIPUNCTURE: CPT | Mod: S$GLB,,, | Performed by: INTERNAL MEDICINE

## 2023-09-14 PROCEDURE — 36415 PR COLLECTION VENOUS BLOOD,VENIPUNCTURE: ICD-10-PCS | Mod: S$GLB,,, | Performed by: INTERNAL MEDICINE

## 2023-11-16 ENCOUNTER — PATIENT MESSAGE (OUTPATIENT)
Dept: ADMINISTRATIVE | Facility: HOSPITAL | Age: 63
End: 2023-11-16
Payer: MEDICARE

## 2023-11-21 ENCOUNTER — PATIENT OUTREACH (OUTPATIENT)
Dept: ADMINISTRATIVE | Facility: HOSPITAL | Age: 63
End: 2023-11-21
Payer: MEDICARE

## 2023-11-21 ENCOUNTER — TELEPHONE (OUTPATIENT)
Dept: ADMINISTRATIVE | Facility: HOSPITAL | Age: 63
End: 2023-11-21
Payer: MEDICARE

## 2024-02-22 ENCOUNTER — CLINICAL SUPPORT (OUTPATIENT)
Dept: OBSTETRICS AND GYNECOLOGY | Facility: CLINIC | Age: 64
End: 2024-02-22
Payer: MEDICARE

## 2024-02-22 ENCOUNTER — OFFICE VISIT (OUTPATIENT)
Dept: PRIMARY CARE CLINIC | Facility: CLINIC | Age: 64
End: 2024-02-22
Payer: MEDICARE

## 2024-02-22 VITALS
WEIGHT: 165.88 LBS | TEMPERATURE: 98 F | HEART RATE: 79 BPM | HEIGHT: 66 IN | SYSTOLIC BLOOD PRESSURE: 115 MMHG | DIASTOLIC BLOOD PRESSURE: 79 MMHG | OXYGEN SATURATION: 98 % | BODY MASS INDEX: 26.66 KG/M2 | RESPIRATION RATE: 16 BRPM

## 2024-02-22 DIAGNOSIS — I10 PRIMARY HYPERTENSION: Primary | ICD-10-CM

## 2024-02-22 DIAGNOSIS — E78.2 MIXED HYPERLIPIDEMIA: ICD-10-CM

## 2024-02-22 DIAGNOSIS — Z01.89 ROUTINE LAB DRAW: Primary | ICD-10-CM

## 2024-02-22 LAB
ABS NRBC COUNT: 0 THOU/UL (ref 0–0.01)
ABSOLUTE BASOPHIL: 0 10*3/UL (ref 0–0.3)
ABSOLUTE EOSINOPHIL: 0 10*3/UL (ref 0–0.6)
ABSOLUTE IMMATURE GRAN: 0.02 THOU/UL (ref 0–0.03)
ABSOLUTE LYMPHOCYTE: 1.2 10*3/UL (ref 1.2–4)
ABSOLUTE MONOCYTE: 0.2 10*3/UL (ref 0.1–0.8)
ALBUMIN SERPL BCP-MCNC: 4.4 G/DL (ref 3.4–5)
ALP SERPL-CCNC: 89 U/L (ref 45–117)
ALT SERPL W P-5'-P-CCNC: 37 U/L (ref 13–56)
ANION GAP SERPL CALC-SCNC: 6 MMOL/L (ref 3–11)
AST SERPL-CCNC: 29 U/L (ref 15–37)
BASOPHILS NFR BLD: 0.5 % (ref 0–3)
BILIRUB SERPL-MCNC: 0.6 MG/DL (ref 0.2–1)
BUN SERPL-MCNC: 13 MG/DL (ref 7–18)
BUN/CREAT SERPL: 14.44 RATIO
CALCIUM SERPL-MCNC: 9.5 MG/DL (ref 8.5–10.1)
CHLORIDE SERPL-SCNC: 105 MMOL/L (ref 98–107)
CHOLEST SERPL-MSCNC: 181 MG/DL
CO2 SERPL-SCNC: 29 MMOL/L (ref 21–32)
CREAT SERPL-MCNC: 0.9 MG/DL (ref 0.55–1.02)
EOSINOPHIL NFR BLD: 0.7 % (ref 0–6)
ERYTHROCYTE [DISTWIDTH] IN BLOOD BY AUTOMATED COUNT: 12.9 % (ref 0–15.5)
GFR ESTIMATION: > 60
GLUCOSE SERPL-MCNC: 117 MG/DL (ref 74–106)
HCT VFR BLD AUTO: 43 % (ref 37–47)
HDLC SERPL-MCNC: 74 MG/DL
HGB BLD-MCNC: 14.2 G/DL (ref 12–16)
IMMATURE GRANULOCYTES: 0.4 % (ref 0–0.43)
LDLC SERPL CALC-MCNC: 89.6 MG/DL
LYMPHOCYTES NFR BLD: 20.8 % (ref 20–45)
MCH RBC QN AUTO: 30.5 PG (ref 27–32)
MCHC RBC AUTO-ENTMCNC: 33 % (ref 32–36)
MCV RBC AUTO: 92.5 FL (ref 80–99)
MONOCYTES NFR BLD: 4.3 % (ref 2–10)
NEUTROPHILS # BLD AUTO: 4.1 10*3/UL (ref 1.4–7)
NEUTROPHILS NFR BLD: 73.3 % (ref 50–80)
NUCLEATED RED BLOOD CELLS: 0 % (ref 0–0.2)
PLATELETS: 253 10*3/UL (ref 130–400)
PMV BLD AUTO: 10.4 FL (ref 9.2–12.2)
POTASSIUM SERPL-SCNC: 3.9 MMOL/L (ref 3.5–5.1)
PROT SERPL-MCNC: 7.1 G/DL (ref 6.4–8.2)
RBC # BLD AUTO: 4.65 10*6/UL (ref 4.2–5.4)
SODIUM BLD-SCNC: 140 MMOL/L (ref 131–143)
TRIGL SERPL-MCNC: 87 MG/DL (ref 0–149)
TSH SERPL DL<=0.005 MIU/L-ACNC: 1.06 UIU/ML (ref 0.36–3.74)
VLDL CHOLESTEROL: 17 MG/DL
WBC # BLD: 5.6 10*3/UL (ref 4.5–10)

## 2024-02-22 PROCEDURE — 99214 OFFICE O/P EST MOD 30 MIN: CPT | Mod: S$GLB,,, | Performed by: INTERNAL MEDICINE

## 2024-02-22 PROCEDURE — 36415 COLL VENOUS BLD VENIPUNCTURE: CPT | Mod: S$GLB,,, | Performed by: INTERNAL MEDICINE

## 2024-02-22 RX ORDER — ATORVASTATIN CALCIUM 40 MG/1
40 TABLET, FILM COATED ORAL DAILY
Qty: 90 TABLET | Refills: 3 | Status: SHIPPED | OUTPATIENT
Start: 2024-02-22 | End: 2025-02-21

## 2024-02-22 NOTE — PROGRESS NOTES
"  Subjective:      Patient ID: Safia Willingham is a 63 y.o. female.    Chief Complaint: Hypertension (3month f/u )    :  Hypertension and blood pressure seem to be under good control.  Patient home blood pressures running 117-128/70-80.  Patient blood pressure in office are slightly high probably secondary to white coat hypertension.  Patient denies any chest pain shortness of breath, ankle swelling, no dizziness.    Patient has hyperlipidemia with probable familial hypercholesterolemia according to Jose broom criteria.  Patient is on atorvastatin 40 mg (did not tolerate 80 mg) + Zetia.  Last LDL of 106 was not at goal but I am expecting better cholesterol after Zetia was added.  Patient fasting blood sugar of 123 was high but A1c of 5.7 is at goal.    Review of Systems   Constitutional:  Negative for chills, diaphoresis, fever, malaise/fatigue and weight loss.   HENT:  Negative for congestion, ear pain, sinus pain, sore throat and tinnitus.    Eyes:  Negative for blurred vision and photophobia.   Respiratory:  Negative for cough, hemoptysis, shortness of breath and wheezing.    Cardiovascular:  Negative for chest pain, palpitations, orthopnea, leg swelling and PND.   Gastrointestinal:  Negative for abdominal pain, blood in stool, constipation, diarrhea, heartburn, melena, nausea and vomiting.   Genitourinary:  Negative for dysuria, frequency and urgency.   Musculoskeletal:  Negative for back pain, myalgias and neck pain.   Skin:  Negative for rash.   Neurological:  Negative for dizziness, tremors, seizures, loss of consciousness and weakness.   Endo/Heme/Allergies:  Negative for polydipsia.   Psychiatric/Behavioral:  Negative for depression and hallucinations. The patient does not have insomnia.      Objective:   /79 Comment: home BP  Pulse 79   Temp 97.9 °F (36.6 °C) (Oral)   Resp 16   Ht 5' 6" (1.676 m)   Wt 75.3 kg (165 lb 14.4 oz)   SpO2 98%   BMI 26.78 kg/m²   Physical Exam  Constitutional:       " General: She is not in acute distress.     Appearance: She is not diaphoretic.   Neck:      Thyroid: No thyromegaly.   Cardiovascular:      Rate and Rhythm: Normal rate and regular rhythm.      Heart sounds: Normal heart sounds. No murmur heard.  Pulmonary:      Effort: Pulmonary effort is normal. No respiratory distress.      Breath sounds: Normal breath sounds. No wheezing.   Abdominal:      General: Bowel sounds are normal. There is no distension.      Palpations: Abdomen is soft.      Tenderness: There is no abdominal tenderness.   Lymphadenopathy:      Cervical: No cervical adenopathy.   Neurological:      Mental Status: She is alert and oriented to person, place, and time.   Psychiatric:         Behavior: Behavior normal.         Thought Content: Thought content normal.         Judgment: Judgment normal.           ICD-10-CM ICD-9-CM   1. Primary hypertension  I10 401.9   2. Mixed hyperlipidemia  E78.2 272.2       Plan:     Patient has hypertension and home blood pressure seem under good control.  Will continue medication  Patient has probable familial hypercholesterolemia by Jose broom criteria.  Last LDL of 106 is still not at goal and Zetia was added  Will repeat lipid profile.  Advised patient to get shingle, pneumonia, RSV vaccine    Medication List with Changes/Refills   Current Medications    EZETIMIBE (ZETIA) 10 MG TABLET    Take 1 tablet (10 mg total) by mouth once daily.    HYDROCHLOROTHIAZIDE (HYDRODIURIL) 12.5 MG TAB    Take 1 tablet (12.5 mg total) by mouth once daily.   Changed and/or Refilled Medications    Modified Medication Previous Medication    ATORVASTATIN (LIPITOR) 40 MG TABLET atorvastatin (LIPITOR) 40 MG tablet       Take 1 tablet (40 mg total) by mouth once daily.    Take 1 tablet (40 mg total) by mouth once daily.

## 2024-03-02 DIAGNOSIS — I10 BENIGN ESSENTIAL HTN: ICD-10-CM

## 2024-03-04 RX ORDER — HYDROCHLOROTHIAZIDE 12.5 MG/1
12.5 TABLET ORAL
Qty: 30 TABLET | Refills: 11 | Status: SHIPPED | OUTPATIENT
Start: 2024-03-04

## 2024-05-10 ENCOUNTER — PATIENT MESSAGE (OUTPATIENT)
Dept: ADMINISTRATIVE | Facility: HOSPITAL | Age: 64
End: 2024-05-10
Payer: MEDICARE

## 2024-05-11 DIAGNOSIS — Z12.11 SCREENING FOR COLON CANCER: ICD-10-CM

## 2024-06-01 LAB — HEMOCCULT STL QL IA: POSITIVE

## 2024-06-05 DIAGNOSIS — R19.5 FECAL OCCULT BLOOD TEST POSITIVE: Primary | ICD-10-CM

## 2024-07-01 NOTE — PROGRESS NOTES
Clinic Note    Reason for visit:  The primary encounter diagnosis was Fecal occult blood test positive. A diagnosis of Screening for malignant neoplasm of colon was also pertinent to this visit.    PCP: Lydia Almonte   [address] / [facility] [address]    HPI:  This is a 63 y.o. female here to be established. Patient with positive FIT. Does state she sees intermittent small amount of BRBPR due to hemorrhoids. BM are regular. Denies reflux, n/v, dysphagia.    She did have colonoscopy around 12 years ago at [facility]. Was told it was normal.    5/26/2024 FIT -Positive    Review of Systems   Constitutional:  Negative for fatigue, fever and unexpected weight change.   HENT:  Negative for mouth sores, postnasal drip, sore throat and trouble swallowing.    Eyes:  Negative for pain, discharge and eye dryness.   Respiratory:  Negative for apnea, cough, choking, chest tightness, shortness of breath and wheezing.    Cardiovascular:  Negative for chest pain, palpitations and leg swelling.   Gastrointestinal:  Positive for anal bleeding. Negative for abdominal distention, abdominal pain, blood in stool, change in bowel habit, constipation, diarrhea, nausea, rectal pain, vomiting, reflux and fecal incontinence.   Genitourinary:  Negative for bladder incontinence, dysuria and hematuria.   Musculoskeletal:  Negative for arthralgias, back pain and joint swelling.   Integumentary:  Negative for color change and rash.   Allergic/Immunologic: Negative for environmental allergies and food allergies.   Neurological:  Negative for seizures and headaches.   Hematological:  Negative for adenopathy. Does not bruise/bleed easily.        Past Medical History:   Diagnosis Date    Hypercholesterolemia 4/14/2023    Primary hypertension 4/14/2023     Past Surgical History:   Procedure Laterality Date    AUGMENTATION OF BREAST      bladder lift      tummy tuck       Family History   Problem Relation  "Name Age of Onset    Heart disease Mother      Heart disease Father      Heart disease Brother      Prostate cancer Brother      Throat cancer Maternal Grandmother      Lung cancer Maternal Grandmother       Social History     Tobacco Use    Smoking status: Never     Passive exposure: Never    Smokeless tobacco: Never   Substance Use Topics    Alcohol use: Yes     Alcohol/week: 2.0 standard drinks of alcohol     Types: 2 Glasses of wine per week     Comment: occasional    Drug use: Never     Review of patient's allergies indicates:  No Known Allergies   Medication List with Changes/Refills   New Medications    SOD SULF-POT CHLORIDE-MAG SULF (SUTAB) 1.479-0.188- 0.225 GRAM TABLET    Take according to package instructions with indicated amount of water. No breakfast day before test. May substitute with Suprep, Clenpiq, Plenvu, Moviprep or GoLytely based on Rx plan and patient preference.   Current Medications    ATORVASTATIN (LIPITOR) 40 MG TABLET    Take 1 tablet (40 mg total) by mouth once daily.    EZETIMIBE (ZETIA) 10 MG TABLET    Take 1 tablet (10 mg total) by mouth once daily.    HYDROCHLOROTHIAZIDE (HYDRODIURIL) 12.5 MG TAB    TAKE 1 TABLET BY MOUTH EVERY DAY         Vital Signs:  BP (!) 172/93   Pulse 100   Temp 98 °F (36.7 °C)   Resp 16   Ht 5' 6" (1.676 m)   Wt 69.6 kg (153 lb 6.4 oz)   SpO2 98%   BMI 24.76 kg/m²        Physical Exam  Vitals reviewed.   Constitutional:       General: She is awake. She is not in acute distress.     Appearance: Normal appearance. She is well-developed. She is not ill-appearing, toxic-appearing or diaphoretic.   HENT:      Head: Normocephalic and atraumatic.      Nose: Nose normal.      Mouth/Throat:      Mouth: Mucous membranes are moist.      Pharynx: Oropharynx is clear. No oropharyngeal exudate or posterior oropharyngeal erythema.   Eyes:      General: Lids are normal. Gaze aligned appropriately. No scleral icterus.        Right eye: No discharge.         Left eye: " No discharge.      Conjunctiva/sclera: Conjunctivae normal.   Neck:      Trachea: Trachea normal.   Cardiovascular:      Rate and Rhythm: Normal rate and regular rhythm.      Pulses:           Radial pulses are 2+ on the right side and 2+ on the left side.   Pulmonary:      Effort: Pulmonary effort is normal. No respiratory distress.      Breath sounds: No stridor. No wheezing.   Chest:      Chest wall: No tenderness.   Abdominal:      General: Bowel sounds are normal. There is no distension.      Palpations: Abdomen is soft. There is no fluid wave, hepatomegaly or mass.      Tenderness: There is no abdominal tenderness. There is no guarding or rebound.   Musculoskeletal:         General: No tenderness or deformity.      Cervical back: Full passive range of motion without pain and neck supple. No tenderness.      Right lower leg: No edema.      Left lower leg: No edema.   Lymphadenopathy:      Cervical: No cervical adenopathy.   Skin:     General: Skin is warm and dry.      Capillary Refill: Capillary refill takes less than 2 seconds.      Coloration: Skin is not cyanotic, jaundiced or pale.   Neurological:      General: No focal deficit present.      Mental Status: She is alert and oriented to person, place, and time.      Motor: No tremor.   Psychiatric:         Attention and Perception: Attention normal.         Mood and Affect: Mood and affect normal.         Speech: Speech normal.         Behavior: Behavior normal. Behavior is cooperative.            All of the data above and below has been reviewed by myself and any further interpretations will be reflected in the assessment and plan.   The data includes review of external notes, and independent interpretation of lab results, procedures, x-rays, and imaging reports.      Assessment:  Fecal occult blood test positive  -     Ambulatory referral/consult to Gastroenterology    Screening for malignant neoplasm of colon  -     Ambulatory Referral to External  Surgery  -     sod sulf-pot chloride-mag sulf (SUTAB) 1.479-0.188- 0.225 gram tablet; Take according to package instructions with indicated amount of water. No breakfast day before test. May substitute with Suprep, Clenpiq, Plenvu, Moviprep or GoLytely based on Rx plan and patient preference.  Dispense: 24 tablet; Refill: 0    +FIT- overdue for CRC colonoscopy     Recommendations:    Schedule colonoscopy with Dr. Garcia.    Risks, benefits, and alternatives of medical management, any associated procedures, and/or treatment discussed with the patient. Patient given opportunity to ask questions and voices understanding. Patient has elected to proceed with the recommended care modalities as discussed.    Follow up if symptoms worsen or fail to improve.    Order summary:  Orders Placed This Encounter   Procedures    Ambulatory Referral to External Surgery        Instructed patient to notify my office if they have not been contacted within two weeks after any procedures, submitting any samples (biopsies, blood, stool, urine, etc.) or after any imaging (X-ray, CT, MRI, etc.).      Jessica Alas NP    This document may have been created using a voice recognition transcribing system. Incorrect words or phrases may have been missed during proofreading. Please interpret accordingly or contact me for clarification.

## 2024-07-02 ENCOUNTER — OFFICE VISIT (OUTPATIENT)
Dept: GASTROENTEROLOGY | Facility: CLINIC | Age: 64
End: 2024-07-02
Payer: MEDICARE

## 2024-07-02 ENCOUNTER — TELEPHONE (OUTPATIENT)
Dept: GASTROENTEROLOGY | Facility: CLINIC | Age: 64
End: 2024-07-02

## 2024-07-02 VITALS
DIASTOLIC BLOOD PRESSURE: 93 MMHG | RESPIRATION RATE: 16 BRPM | SYSTOLIC BLOOD PRESSURE: 172 MMHG | BODY MASS INDEX: 24.65 KG/M2 | WEIGHT: 153.38 LBS | HEART RATE: 100 BPM | TEMPERATURE: 98 F | HEIGHT: 66 IN | OXYGEN SATURATION: 98 %

## 2024-07-02 DIAGNOSIS — R19.5 FECAL OCCULT BLOOD TEST POSITIVE: Primary | ICD-10-CM

## 2024-07-02 DIAGNOSIS — Z12.11 SCREENING FOR MALIGNANT NEOPLASM OF COLON: ICD-10-CM

## 2024-07-02 RX ORDER — SOD SULF/POT CHLORIDE/MAG SULF 1.479 G
TABLET ORAL
Qty: 24 TABLET | Refills: 0 | Status: SHIPPED | OUTPATIENT
Start: 2024-07-02

## 2024-07-02 NOTE — PATIENT INSTRUCTIONS
Schedule colonoscopy with Dr. Garcia.    Please notify my office if you have not been contacted within two weeks after any procedures, submitting any samples (biopsies, blood, stool, urine, etc.) or after any imaging (X-ray, CT, MRI, etc.).

## 2024-07-02 NOTE — TELEPHONE ENCOUNTER
Patient was given instructions and they were reviewed with patient. Patient was given subtab coupon. Patient was given AVS with apt details. Order for colonoscopy faxed to central scheduling @ [facility]. No pa required. 7/2/24 LRA

## 2024-08-31 DIAGNOSIS — E78.2 MIXED HYPERLIPIDEMIA: ICD-10-CM

## 2024-09-03 RX ORDER — EZETIMIBE 10 MG/1
10 TABLET ORAL
Qty: 90 TABLET | Refills: 3 | Status: SHIPPED | OUTPATIENT
Start: 2024-09-03

## 2024-09-10 ENCOUNTER — OFFICE VISIT (OUTPATIENT)
Dept: PRIMARY CARE CLINIC | Facility: CLINIC | Age: 64
End: 2024-09-10
Payer: MEDICARE

## 2024-09-10 VITALS
HEART RATE: 87 BPM | HEIGHT: 66 IN | RESPIRATION RATE: 16 BRPM | SYSTOLIC BLOOD PRESSURE: 90 MMHG | DIASTOLIC BLOOD PRESSURE: 62 MMHG | OXYGEN SATURATION: 98 % | TEMPERATURE: 98 F | BODY MASS INDEX: 24.11 KG/M2 | WEIGHT: 150 LBS

## 2024-09-10 DIAGNOSIS — Z12.31 BREAST CANCER SCREENING BY MAMMOGRAM: ICD-10-CM

## 2024-09-10 DIAGNOSIS — E78.2 MIXED HYPERLIPIDEMIA: Primary | ICD-10-CM

## 2024-09-10 DIAGNOSIS — I10 BENIGN ESSENTIAL HTN: ICD-10-CM

## 2024-09-10 DIAGNOSIS — Z78.0 POST-MENOPAUSAL: ICD-10-CM

## 2024-09-10 NOTE — PROGRESS NOTES
"  Subjective:      Patient ID: Safia Willingham is a 64 y.o. female.    Chief Complaint: Hypertension (6month f/u )    :  Patient with hypertension and blood pressure seem to be running high in clinic.  Patient reports blood pressures in clinic are always high but home blood pressures are running between /65-70.  Patient denies any dizziness lightheadedness, chest pain shortness on breath, ankle swelling.    Patient has hyperlipidemia with probable familial hypercholesterolemia according to Jose broom criteria.  Patient is on atorvastatin 40 mg (did not tolerate 80 mg) + Zetia.  Patient last LDL of 89 is at goal.    Patient FOBT test was positive and colonoscopy is scheduled for November 20, 2024    Review of Systems   Constitutional:  Positive for weight loss (3 lb weight loss). Negative for chills, diaphoresis, fever and malaise/fatigue.   HENT:  Negative for congestion, ear pain, sinus pain, sore throat and tinnitus.    Eyes:  Negative for blurred vision and photophobia.   Respiratory:  Negative for cough, hemoptysis, shortness of breath and wheezing.    Cardiovascular:  Negative for chest pain, palpitations, orthopnea, leg swelling and PND.   Gastrointestinal:  Negative for abdominal pain, blood in stool, constipation, diarrhea, heartburn, melena, nausea and vomiting.   Genitourinary:  Negative for dysuria, frequency and urgency.   Musculoskeletal:  Negative for back pain, myalgias and neck pain.   Skin:  Negative for rash.   Neurological:  Negative for dizziness, tremors, seizures, loss of consciousness and weakness.   Endo/Heme/Allergies:  Negative for polydipsia.   Psychiatric/Behavioral:  Negative for depression and hallucinations. The patient does not have insomnia.      Objective:   BP (!) 152/85 (BP Location: Left arm, Patient Position: Sitting, BP Method: Medium (Automatic))   Pulse 87   Temp 98.4 °F (36.9 °C) (Oral)   Resp 16   Ht 5' 6" (1.676 m)   Wt 68 kg (150 lb)   SpO2 98%   BMI 24.21 " kg/m²     Physical Exam: Patient not examined   Assessment:       ICD-10-CM ICD-9-CM   1. Mixed hyperlipidemia  E78.2 272.2   2. Benign essential HTN  I10 401.1   3. Breast cancer screening by mammogram  Z12.31 V76.12   4. Post-menopausal  Z78.0 V49.81       Plan:     Patient with hypertension and blood pressure seem to be running high  Home blood pressures are under good control  Will not change medication  Patient has hyperlipidemia with last LDL of 89 is at goal.  Will continue medication  Will order mammogram + DEXA scan  Patient FOBT was positive and colonoscopy is scheduled  Advised patient to keep appointment.  Medication List with Changes/Refills   Current Medications    ATORVASTATIN (LIPITOR) 40 MG TABLET    Take 1 tablet (40 mg total) by mouth once daily.    EZETIMIBE (ZETIA) 10 MG TABLET    TAKE 1 TABLET BY MOUTH EVERY DAY    HYDROCHLOROTHIAZIDE (HYDRODIURIL) 12.5 MG TAB    TAKE 1 TABLET BY MOUTH EVERY DAY    SOD SULF-POT CHLORIDE-MAG SULF (SUTAB) 1.479-0.188- 0.225 GRAM TABLET    Take according to package instructions with indicated amount of water. No breakfast day before test. May substitute with Suprep, Clenpiq, Plenvu, Moviprep or GoLytely based on Rx plan and patient preference.

## 2024-11-14 ENCOUNTER — TELEPHONE (OUTPATIENT)
Dept: GASTROENTEROLOGY | Facility: CLINIC | Age: 64
End: 2024-11-14
Payer: MEDICARE

## 2024-11-14 VITALS — HEIGHT: 66 IN | WEIGHT: 150 LBS | BODY MASS INDEX: 24.11 KG/M2

## 2024-11-14 DIAGNOSIS — Z12.11 SCREENING FOR MALIGNANT NEOPLASM OF COLON: Primary | ICD-10-CM

## 2024-11-14 NOTE — TELEPHONE ENCOUNTER
"[facility]  [address] [place] [address]  Phone: [phone]  Fax: [phone]    History & Physical         Provider: Dr. Nemo Garcia    Patient Name: Safia CAMPBELL (age):1960  64 y.o.           Gender: female   Phone: [phone]     Referring Physician: Lydia Almonte     Vital Signs:   Height - 5' 6"  Weight - 150 lb   BMI -  24.20    Plan: Colonoscopy @ [facility]    Encounter Diagnosis   Name Primary?    Screening for malignant neoplasm of colon Yes           History:      Past Medical History:   Diagnosis Date    Hypercholesterolemia 2023    Primary hypertension 2023      Past Surgical History:   Procedure Laterality Date    AUGMENTATION OF BREAST      bladder lift      tummy tuck        Medication List with Changes/Refills   Current Medications    ATORVASTATIN (LIPITOR) 40 MG TABLET    Take 1 tablet (40 mg total) by mouth once daily.    EZETIMIBE (ZETIA) 10 MG TABLET    TAKE 1 TABLET BY MOUTH EVERY DAY    HYDROCHLOROTHIAZIDE (HYDRODIURIL) 12.5 MG TAB    TAKE 1 TABLET BY MOUTH EVERY DAY    SOD SULF-POT CHLORIDE-MAG SULF (SUTAB) 1.479-0.188- 0.225 GRAM TABLET    Take according to package instructions with indicated amount of water. No breakfast day before test. May substitute with Suprep, Clenpiq, Plenvu, Moviprep or GoLytely based on Rx plan and patient preference.      Review of patient's allergies indicates:  No Known Allergies   Family History   Problem Relation Name Age of Onset    Heart disease Mother      Heart disease Father      Heart disease Brother      Prostate cancer Brother      Throat cancer Maternal Grandmother      Lung cancer Maternal Grandmother        Social History     Tobacco Use    Smoking status: Never     Passive exposure: Never    Smokeless tobacco: Never   Substance Use Topics    Alcohol use: Yes     Alcohol/week: 2.0 standard drinks of alcohol     " Types: 2 Glasses of wine per week     Comment: occasional    Drug use: Never        Physical Examination:     General Appearance:___________________________  HEENT: _____________________________________  Abdomen:____________________________________  Heart:________________________________________  Lungs:_______________________________________  Extremities:___________________________________  Skin:_________________________________________  Endocrine:____________________________________  Genitourinary:_________________________________  Neurological:__________________________________      Patient has been evaluated immediately prior to sedation and is medically cleared for endoscopy with IVCS as an ASA class: ______      Physician Signature: _________________________       Date: ________  Time: ________

## 2024-11-14 NOTE — TELEPHONE ENCOUNTER
S/w pt and told her that I was calling as a courtesy regarding up coming EGD with NBP on 11/21/24, Thurs and wanted to verify that she has her paper prep instructions. Pt stated she has both.  I also mentioned that [facility] will call the day before (WED) with the arrival time, GI Lab is located on the third floor, and to pre-register before next Wed. miller

## 2024-11-21 ENCOUNTER — OUTSIDE PLACE OF SERVICE (OUTPATIENT)
Dept: GASTROENTEROLOGY | Facility: CLINIC | Age: 64
End: 2024-11-21

## 2024-11-21 PROCEDURE — G0121 COLON CA SCRN NOT HI RSK IND: HCPCS | Mod: 53,,, | Performed by: INTERNAL MEDICINE

## 2024-12-09 ENCOUNTER — DOCUMENTATION ONLY (OUTPATIENT)
Dept: GASTROENTEROLOGY | Facility: CLINIC | Age: 64
End: 2024-12-09
Payer: MEDICARE

## 2025-01-03 DIAGNOSIS — I10 BENIGN ESSENTIAL HTN: ICD-10-CM

## 2025-01-03 RX ORDER — HYDROCHLOROTHIAZIDE 12.5 MG/1
12.5 TABLET ORAL
Qty: 90 TABLET | Refills: 0 | Status: SHIPPED | OUTPATIENT
Start: 2025-01-03

## 2025-03-04 ENCOUNTER — TELEPHONE (OUTPATIENT)
Dept: GASTROENTEROLOGY | Facility: CLINIC | Age: 65
End: 2025-03-04
Payer: MEDICARE

## 2025-03-04 NOTE — TELEPHONE ENCOUNTER
Fecal contamination on her last colonoscopy in 11/2024 which was incomplete. Reschedule a repeat colonoscopy with OV with me 3 weeks prior to that (in 7/2025).  We will review a good BM regimen and extended prep.  NBP

## 2025-03-05 NOTE — TELEPHONE ENCOUNTER
S/W pt & got her scheduled for her Colonoscopy on July 28th 2025. Also scheduled her o/v 3 weeks prior on July 7th 2025. -SHARAN

## 2025-03-29 DIAGNOSIS — I10 BENIGN ESSENTIAL HTN: ICD-10-CM

## 2025-03-31 RX ORDER — HYDROCHLOROTHIAZIDE 12.5 MG/1
12.5 TABLET ORAL
Qty: 90 TABLET | Refills: 0 | OUTPATIENT
Start: 2025-03-31

## 2025-06-30 ENCOUNTER — TELEPHONE (OUTPATIENT)
Dept: GASTROENTEROLOGY | Facility: CLINIC | Age: 65
End: 2025-06-30
Payer: MEDICARE

## 2025-06-30 NOTE — TELEPHONE ENCOUNTER
Pre Chart:    Fecal contamination on her last colonoscopy in 11/2024 which was incomplete. Reschedule a repeat colonoscopy with OV with me 3 weeks prior to that (in 7/2025).         She did have colonoscopy around 12 years ago at [facility]. Was told it was normal.     5/26/2024 FIT -Positive  TLW

## 2025-07-07 ENCOUNTER — OFFICE VISIT (OUTPATIENT)
Dept: GASTROENTEROLOGY | Facility: CLINIC | Age: 65
End: 2025-07-07
Payer: MEDICARE

## 2025-07-07 VITALS
HEART RATE: 78 BPM | SYSTOLIC BLOOD PRESSURE: 130 MMHG | OXYGEN SATURATION: 97 % | DIASTOLIC BLOOD PRESSURE: 76 MMHG | BODY MASS INDEX: 22.79 KG/M2 | WEIGHT: 141.81 LBS | HEIGHT: 66 IN

## 2025-07-07 DIAGNOSIS — R79.89 ELEVATED LFTS: Primary | ICD-10-CM

## 2025-07-07 DIAGNOSIS — Z12.12 SCREENING FOR COLORECTAL CANCER: ICD-10-CM

## 2025-07-07 DIAGNOSIS — R19.5 FECAL OCCULT BLOOD TEST POSITIVE: ICD-10-CM

## 2025-07-07 DIAGNOSIS — Z12.11 SCREENING FOR COLORECTAL CANCER: ICD-10-CM

## 2025-07-07 PROCEDURE — 99213 OFFICE O/P EST LOW 20 MIN: CPT | Mod: S$PBB,,,

## 2025-07-07 RX ORDER — SOD SULF/POT CHLORIDE/MAG SULF 1.479 G
TABLET ORAL
Qty: 24 TABLET | Refills: 0 | Status: CANCELLED | OUTPATIENT
Start: 2025-07-07

## 2025-07-07 RX ORDER — POLYETHYLENE GLYCOL 3350, SODIUM SULFATE ANHYDROUS, SODIUM BICARBONATE, SODIUM CHLORIDE, POTASSIUM CHLORIDE 236; 22.74; 6.74; 5.86; 2.97 G/4L; G/4L; G/4L; G/4L; G/4L
4 POWDER, FOR SOLUTION ORAL ONCE
Qty: 4000 ML | Refills: 0 | Status: SHIPPED | OUTPATIENT
Start: 2025-07-07 | End: 2025-07-07

## 2025-07-07 NOTE — LETTER
July 7, 2025        Lydia Almonte MD  [address]  [place] [address]             [facility]  [address] [place] [address]  Phone: [phone]  Fax: [phone]   Patient: Safia Willingham   MR Number: [id number]   YOB: 1960   Date of Visit: 7/7/2025       Dear Dr. Almonte:    Thank you for referring Safia Willingham to me for evaluation. Attached you will find relevant portions of my assessment and plan of care.    If you have questions, please do not hesitate to call me. I look forward to following Safia Willingham along with you.    Sincerely,      Daniela Allison, NP            CC  No Recipients    Enclosure

## 2025-07-07 NOTE — PROGRESS NOTES
Clinic Note    Reason for visit:  The primary encounter diagnosis was Elevated LFTs. Diagnoses of Fecal occult blood test positive and Screening for colorectal cancer were also pertinent to this visit.    PCP: Lydia Almonte       HPI:  This is a 64 y.o. female who is here for a follow up. Patient had positive FIT test in 2024 and had a colonoscopy following that was incomplete due to inadequate prep. She is scheduled for repeat colonoscopy 7/28/2025. Usually has BM daily. Occasionally has small amount of BRB with wiping. She took Sutab for last prep and it didn't start working till later. She was on a cruise the week prior to last colonoscopy. She had labs in 3/2025 with elevated LFTs. US showed fatty liver. She rarely drinks alcohol. Occasional NSAID use. Sister has fatty liver. No supplements. Had one episode of indigestion last week that lasted one day. No dysphagia. No prior EGD.      RUQ US 4/2025: Mildly heterogeneous hepatic echotexture, most commonly seen with steatosis. PV patent.     4/1/2025: HBVcAb/HBVsAb/HBVsAg/HCVAb neg  3/2025: 68H/75H/90/0.5, CMP onl, CBC/TSH/trigly/cholesterol wnl  2/22/2024: CMP/CBC/lipid wnl  9/2023: ALP 126H, CMP onl, trigly 187H  3/2023: CMP wnl, cholesterol 326H, trigly 201H    Colonoscopy 11/21/2024: Large amount of adherent stool throughout the colon. At hepatic flexure, advancement was limited by looping and fecal contamination and procedure was terminated. Repeat colonoscopy with extended prep.     5/26/2024 FIT -Positive     Review of Systems   Constitutional:  Negative for fatigue, fever and unexpected weight change.   HENT:  Negative for mouth sores, postnasal drip, sore throat and trouble swallowing.    Eyes:  Negative for pain, discharge and eye dryness.   Respiratory:  Negative for apnea, cough, choking, chest tightness, shortness of breath and wheezing.    Cardiovascular:  Negative for chest pain, palpitations and leg swelling.   Gastrointestinal:  Negative for  abdominal distention, abdominal pain, anal bleeding, blood in stool, change in bowel habit, constipation, diarrhea, nausea, rectal pain, vomiting, reflux and fecal incontinence.   Genitourinary:  Negative for bladder incontinence, dysuria and hematuria.   Musculoskeletal:  Negative for arthralgias, back pain and joint swelling.   Integumentary:  Negative for color change and rash.   Allergic/Immunologic: Negative for environmental allergies and food allergies.   Neurological:  Negative for seizures and headaches.   Hematological:  Negative for adenopathy. Does not bruise/bleed easily.        Past Medical History:   Diagnosis Date    BMI 22.0-22.9, adult 07/07/2025    Hypercholesterolemia 04/14/2023    Primary hypertension 04/14/2023     Past Surgical History:   Procedure Laterality Date    AUGMENTATION OF BREAST      bladder lift      tummy ana       Family History   Problem Relation Name Age of Onset    Heart disease Mother      Heart disease Father      Heart disease Brother      Prostate cancer Brother      Throat cancer Maternal Grandmother      Lung cancer Maternal Grandmother      Colon cancer Neg Hx      Esophageal cancer Neg Hx      Liver disease Neg Hx      Rectal cancer Neg Hx      Stomach cancer Neg Hx       Social History[1]  Review of patient's allergies indicates:  No Known Allergies   Medication List with Changes/Refills   New Medications    POLYETHYLENE GLYCOL (GOLYTELY) 236-22.74-6.74 -5.86 GRAM SUSPENSION    Take 4,000 mLs (4 L total) by mouth once. for 1 dose   Current Medications    ATORVASTATIN (LIPITOR) 40 MG TABLET    Take 1 tablet (40 mg total) by mouth once daily.    EZETIMIBE (ZETIA) 10 MG TABLET    TAKE 1 TABLET BY MOUTH EVERY DAY    HYDROCHLOROTHIAZIDE (HYDRODIURIL) 12.5 MG TAB    TAKE 1 TABLET BY MOUTH EVERY DAY   Discontinued Medications    SOD SULF-POT CHLORIDE-MAG SULF (SUTAB) 1.479-0.188- 0.225 GRAM TABLET    Take according to package instructions with indicated amount of water. No  "breakfast day before test. May substitute with Suprep, Clenpiq, Plenvu, Moviprep or GoLytely based on Rx plan and patient preference.         Vital Signs:  /76 (BP Location: Left arm, Patient Position: Sitting)   Pulse 78   Ht 5' 6" (1.676 m)   Wt 64.3 kg (141 lb 12.8 oz)   SpO2 97%   BMI 22.89 kg/m²        Physical Exam  Vitals reviewed.   Constitutional:       General: She is awake. She is not in acute distress.     Appearance: Normal appearance. She is well-developed. She is not ill-appearing, toxic-appearing or diaphoretic.   HENT:      Head: Normocephalic and atraumatic.      Nose: Nose normal.      Mouth/Throat:      Mouth: Mucous membranes are moist.      Pharynx: Oropharynx is clear. No oropharyngeal exudate or posterior oropharyngeal erythema.   Eyes:      General: Lids are normal. Gaze aligned appropriately. No scleral icterus.        Right eye: No discharge.         Left eye: No discharge.      Conjunctiva/sclera: Conjunctivae normal.   Neck:      Trachea: Trachea normal.   Cardiovascular:      Rate and Rhythm: Normal rate and regular rhythm.      Pulses:           Radial pulses are 2+ on the right side and 2+ on the left side.   Pulmonary:      Effort: Pulmonary effort is normal. No respiratory distress.      Breath sounds: No stridor. No wheezing.   Chest:      Chest wall: No tenderness.   Abdominal:      General: Bowel sounds are normal. There is no distension.      Palpations: Abdomen is soft. There is no fluid wave, hepatomegaly or mass.      Tenderness: There is no abdominal tenderness. There is no guarding or rebound.   Musculoskeletal:         General: No tenderness or deformity.      Cervical back: No tenderness.      Right lower leg: No edema.      Left lower leg: No edema.   Lymphadenopathy:      Cervical: No cervical adenopathy.   Skin:     General: Skin is warm and dry.      Capillary Refill: Capillary refill takes less than 2 seconds.      Coloration: Skin is not cyanotic, " jaundiced or pale.   Neurological:      General: No focal deficit present.      Mental Status: She is alert and oriented to person, place, and time.      Motor: No tremor.   Psychiatric:         Attention and Perception: Attention normal.         Mood and Affect: Mood and affect normal.         Speech: Speech normal.         Behavior: Behavior normal. Behavior is cooperative.            All of the data above and below has been reviewed by myself and any further interpretations will be reflected in the assessment and plan.   The data includes review of external notes, and independent interpretation of lab results, procedures, x-rays, and imaging reports.      Assessment:  Elevated LFTs  -     Hepatic Function Panel; Future; Expected date: 07/07/2025    Fecal occult blood test positive  -     Cancel: Ambulatory Referral to External Surgery  -     polyethylene glycol (GOLYTELY) 236-22.74-6.74 -5.86 gram suspension; Take 4,000 mLs (4 L total) by mouth once. for 1 dose  Dispense: 4000 mL; Refill: 0    Screening for colorectal cancer  -     Ambulatory Referral to External Surgery      Positive FIT 5/2024. Had colonoscopy which was terminated due to prep. Scheduled for colonoscopy 7/28/2025. Reviewed extended prep with patient.   Elevated LFTs. US with fatty liver. HCV/HBV neg. Recheck LFTs in 2 months. If still elevated, will complete liver work up with labs. If LFTs nl, she knows she may cancel her OV with us.    Recommendations:    Proceed with colonoscopy with Dr. Garcia on 7/28/2025. You will need two days (rather than one day) of clear liquid diet and MiraLAX 1 capful/dose, 3 times daily the week prior to colonoscopy with goal of very loose stools. Then regular prep instructions the day prior to colonoscopy.     Complete blood work at next visit with Dr. Almonte. Avoid alcohol and all hepatotoxins.   Adhere to low fat/sugar/carbohydrate diet and exercise regularly.       If any tests, procedures, or imaging has been  ordered and you are not contacted to schedule within 1-2 weeks, then you may call the central scheduling department directly at [phone].     Risks, benefits, and alternatives of medical management, any associated procedures, and/or treatment discussed with the patient. Patient given opportunity to ask questions and voices understanding. Patient has elected to proceed with the recommended care modalities as discussed.    Follow up in about 3 months (around 10/7/2025).    Order summary:  Orders Placed This Encounter   Procedures    Hepatic Function Panel    Ambulatory Referral to External Surgery          Instructed patient to notify my office if they have not been contacted within two weeks after any procedures, submitting any samples (biopsies, blood, stool, urine, etc.) or after any imaging (X-ray, CT, MRI, etc.).      Daniela Allison NP    This document may have been created using a voice recognition transcribing system. Incorrect words or phrases may have been missed during proofreading. Please interpret accordingly or contact me for clarification.          [1]   Social History  Tobacco Use    Smoking status: Never     Passive exposure: Never    Smokeless tobacco: Never   Substance Use Topics    Alcohol use: Yes     Alcohol/week: 2.0 standard drinks of alcohol     Types: 2 Glasses of wine per week     Comment: occasional    Drug use: Never

## 2025-07-07 NOTE — PATIENT INSTRUCTIONS
Proceed with colonoscopy with Dr. Garcia on 7/28/2025. You will need two days (rather than one day) of clear liquid diet and MiraLAX 1 capful/dose, 3 times daily the week prior to colonoscopy with goal of very loose stools. Then regular prep instructions the day prior to colonoscopy.     Complete blood work at next visit with Dr. Almonte. Avoid alcohol and all hepatotoxins.   Adhere to low fat/sugar/carbohydrate diet and exercise regularly.       If any tests, procedures, or imaging has been ordered and you are not contacted to schedule within 1-2 weeks, then you may call the central scheduling department directly at [phone].   Please notify my office if you have not been contacted within two weeks after any procedures, submitting any samples (biopsies, blood, stool, urine, etc.) or after any imaging (X-ray, CT, MRI, etc.).

## 2025-07-28 ENCOUNTER — OUTSIDE PLACE OF SERVICE (OUTPATIENT)
Dept: GASTROENTEROLOGY | Facility: CLINIC | Age: 65
End: 2025-07-28

## 2025-08-03 ENCOUNTER — RESULTS FOLLOW-UP (OUTPATIENT)
Dept: GASTROENTEROLOGY | Facility: CLINIC | Age: 65
End: 2025-08-03
Payer: MEDICARE

## 2025-08-03 NOTE — TELEPHONE ENCOUNTER
2 TA, 1 hyp, repeat colonoscopy with extended prep in 3 years.     Notify patient. Confirm recall tab in appointment desk is up-to-date (update if needed).  NBP

## 2025-09-03 ENCOUNTER — RESULTS FOLLOW-UP (OUTPATIENT)
Dept: GASTROENTEROLOGY | Facility: CLINIC | Age: 65
End: 2025-09-03
Payer: MEDICARE